# Patient Record
Sex: FEMALE | Race: WHITE
[De-identification: names, ages, dates, MRNs, and addresses within clinical notes are randomized per-mention and may not be internally consistent; named-entity substitution may affect disease eponyms.]

---

## 2017-07-14 ENCOUNTER — HOSPITAL ENCOUNTER (EMERGENCY)
Dept: HOSPITAL 25 - UCEAST | Age: 57
Discharge: HOME | End: 2017-07-14
Payer: COMMERCIAL

## 2017-07-14 VITALS — DIASTOLIC BLOOD PRESSURE: 73 MMHG | SYSTOLIC BLOOD PRESSURE: 130 MMHG

## 2017-07-14 DIAGNOSIS — N39.0: Primary | ICD-10-CM

## 2017-07-14 DIAGNOSIS — Z87.891: ICD-10-CM

## 2017-07-14 PROCEDURE — G0463 HOSPITAL OUTPT CLINIC VISIT: HCPCS

## 2017-07-14 PROCEDURE — 81003 URINALYSIS AUTO W/O SCOPE: CPT

## 2017-07-14 PROCEDURE — 99212 OFFICE O/P EST SF 10 MIN: CPT

## 2017-07-14 PROCEDURE — 87086 URINE CULTURE/COLONY COUNT: CPT

## 2017-07-14 NOTE — UC
Complaint Female HPI





- HPI Summary


HPI Summary: 





complaint of burning with urination that started 3 days ago


 increase in frequency and urgency


mild lower back pain


denies flank and abdominal pain


 denies fever and chills


not taking any medication for pain





- History Of Current Complaint


Hx Obtained From: Patient





<Sulema Soni - Last Filed: 17 11:14>





<Rosa Cruz - Last Filed: 17 15:35>





- History Of Current Complaint


Chief Complaint: UCAbdominalPain


Stated Complaint: URINATION COMPLAINT


Time Seen by Provider: 17 11:06





- Allergies/Home Medications


Allergies/Adverse Reactions: 


 Allergies











Allergy/AdvReac Type Severity Reaction Status Date / Time


 


Fexofenadine [From Allegra] Allergy Intermediate See Comment Verified 17 

10:06


 


Adhesive Tape Allergy  terrible Verified 17 10:06





   rash  


 


Codeine AdvReac Severe Dizziness Verified 17 10:06














PMH/Surg Hx/FS Hx/Imm Hx


Previously Healthy: Yes - repaired aneurysm





- Surgical History


Surgical History: Yes


Surgery Procedure, Year, and Place:  .   tubal ligation.   

(right) foot neuroma excision.  2010 (left) carpal tunnel.  2011 (right) 

thyroid nodule.  2 BRAIN ANEURSYMS COILED IN - [place].  RECONSTRUCTIVE 

SURGERY -LEFT FOOT--[facility].  Gallbladder removed 





- Family History


Known Family History: Positive: None - reviewed & noncontributory





- Social History


Occupation: Employed Full-time


Lives: With Family


Alcohol Use: None


Substance Use Type: None


Smoking Status (MU): Former Smoker


Amount Used/How Often: 1 PPD X 30 YEARS


Have You Smoked in the Last Year: No


When Did the Patient Quit Smoking/Using Tobacco: 





<Sulema Soni - Last Filed: 17 11:14>





Review of Systems


Constitutional: Negative


Skin: Negative


Eyes: Negative


ENT: Negative


Respiratory: Negative


Cardiovascular: Negative


Gastrointestinal: Negative


Genitourinary: Dysuria, Frequency, Urgency


Motor: Negative


Neurovascular: Negative


Musculoskeletal: Negative


Neurological: Negative


Psychological: Negative


All Other Systems Reviewed And Are Negative: Yes





<Sulema Soni - Last Filed: 17 11:14>





Physical Exam


Triage Information Reviewed: Yes


Appearance: No Pain Distress, Well-Nourished


Vital Signs: 


 Initial Vital Signs











Temp  97.4 F   17 10:00


 


Pulse  73   17 10:00


 


Resp  16   17 10:00


 


BP  130/73   17 10:00


 


Pulse Ox  97   17 10:00











Vital Signs Reviewed: Yes


Eyes: Positive: Conjunctiva Clear


ENT: Positive: Pharynx normal, TMs normal


Neck: Positive: No Lymphadenopathy


Respiratory: Positive: Lungs clear, Normal breath sounds, No respiratory 

distress, No accessory muscle use


Cardiovascular: Positive: RRR, No Murmur, Pulses Normal


Abdomen Description: Positive: Nontender, No Organomegaly, Soft.  Negative: CVA 

Tenderness (R), CVA Tenderness (L), Distended, Guarding


Bowel Sounds: Positive: Present


Musculoskeletal: Positive: No Edema


Neurological: Positive: Alert


Psychological Exam: Normal


Skin Exam: Normal





<Sulema Soni - Last Filed: 17 11:14>


Vital Signs: 


 Initial Vital Signs











Temp  97.4 F   17 10:00


 


Pulse  73   17 10:00


 


Resp  16   17 10:00


 


BP  130/73   17 10:00


 


Pulse Ox  97   17 10:00














<Rosa Cruz - Last Filed: 17 15:35>





 Complaint Female Dx





- Differential Dx/Diagnosis


Differential Diagnosis/HQI/PQRI: Ureteral Stone, Urinary Tract Infection


Provider Diagnoses: UTI





<Sulema Soni - Last Filed: 17 11:14>





Discharge





<Sulema Soni - Last Filed: 17 11:14>





<Rosa Cruz - Last Filed: 17 15:35>





- Discharge Plan


Condition: Stable


Disposition: HOME


Prescriptions: 


Phenazopyridine TAB* [Pyridium 100 mg TAB*] 100 mg PO TID #6 tab


Sulfamethox/Trimethoprim DS* [Bactrim /160 TAB*] 1 tab PO BID #6 tab


Patient Education Materials:  Urinary Tract Infection in Women (ED)


Referrals: 


Chinmay Lovell MD [Primary Care Provider] - 


Additional Instructions: 


Please start antibiotic as directed


Increase fluids and rest


Take acetaminophen  for fever or pain


Please review your discharge instructions. 


If your symptoms do not improve please call your primary care provider or 

return to urgent care.





.








Attestation Statement


User Type: Provider - I was available for consult. This patient was seen by the 

MELANIA. The patient was not presented to, seen by, or examined by me. -Yeny





<Rosa Cruz - Last Filed: 17 15:35>

## 2019-09-01 ENCOUNTER — HOSPITAL ENCOUNTER (EMERGENCY)
Dept: HOSPITAL 25 - ED | Age: 59
Discharge: HOME | End: 2019-09-01
Payer: COMMERCIAL

## 2019-09-01 VITALS — SYSTOLIC BLOOD PRESSURE: 145 MMHG | DIASTOLIC BLOOD PRESSURE: 72 MMHG

## 2019-09-01 DIAGNOSIS — R10.32: Primary | ICD-10-CM

## 2019-09-01 DIAGNOSIS — Z87.891: ICD-10-CM

## 2019-09-01 LAB
ALBUMIN SERPL BCG-MCNC: 4.2 G/DL (ref 3.2–5.2)
ALBUMIN/GLOB SERPL: 1.6 {RATIO} (ref 1–3)
ALP SERPL-CCNC: 112 U/L (ref 34–104)
ALT SERPL W P-5'-P-CCNC: 21 U/L (ref 7–52)
ANION GAP SERPL CALC-SCNC: 5 MMOL/L (ref 2–11)
AST SERPL-CCNC: 18 U/L (ref 13–39)
BASOPHILS # BLD AUTO: 0.1 10^3/UL (ref 0–0.2)
BUN SERPL-MCNC: 11 MG/DL (ref 6–24)
BUN/CREAT SERPL: 13.4 (ref 8–20)
CALCIUM SERPL-MCNC: 9.6 MG/DL (ref 8.6–10.3)
CHLORIDE SERPL-SCNC: 108 MMOL/L (ref 101–111)
EOSINOPHIL # BLD AUTO: 0.2 10^3/UL (ref 0–0.6)
GLOBULIN SER CALC-MCNC: 2.7 G/DL (ref 2–4)
GLUCOSE SERPL-MCNC: 87 MG/DL (ref 70–100)
HCO3 SERPL-SCNC: 26 MMOL/L (ref 22–32)
HCT VFR BLD AUTO: 39 % (ref 35–47)
HGB BLD-MCNC: 13 G/DL (ref 12–16)
INR PPP/BLD: 1.03 (ref 0.82–1.09)
LYMPHOCYTES # BLD AUTO: 2.2 10^3/UL (ref 1–4.8)
MAGNESIUM SERPL-MCNC: 1.9 MG/DL (ref 1.9–2.7)
MCH RBC QN AUTO: 29 PG (ref 27–31)
MCHC RBC AUTO-ENTMCNC: 33 G/DL (ref 31–36)
MCV RBC AUTO: 86 FL (ref 80–97)
MONOCYTES # BLD AUTO: 1.1 10^3/UL (ref 0–0.8)
NEUTROPHILS # BLD AUTO: 7 10^3/UL (ref 1.5–7.7)
NRBC # BLD AUTO: 0 10^3/UL
NRBC BLD QL AUTO: 0.1
PLATELET # BLD AUTO: 163 10^3/UL (ref 150–450)
POTASSIUM SERPL-SCNC: 3.9 MMOL/L (ref 3.5–5)
PROT SERPL-MCNC: 6.9 G/DL (ref 6.4–8.9)
RBC # BLD AUTO: 4.53 10^6 /UL (ref 3.7–4.87)
SODIUM SERPL-SCNC: 139 MMOL/L (ref 135–145)
WBC # BLD AUTO: 10.7 10^3/UL (ref 3.5–10.8)

## 2019-09-01 PROCEDURE — 80053 COMPREHEN METABOLIC PANEL: CPT

## 2019-09-01 PROCEDURE — 36415 COLL VENOUS BLD VENIPUNCTURE: CPT

## 2019-09-01 PROCEDURE — 83690 ASSAY OF LIPASE: CPT

## 2019-09-01 PROCEDURE — 83735 ASSAY OF MAGNESIUM: CPT

## 2019-09-01 PROCEDURE — 85610 PROTHROMBIN TIME: CPT

## 2019-09-01 PROCEDURE — 99283 EMERGENCY DEPT VISIT LOW MDM: CPT

## 2019-09-01 PROCEDURE — 86140 C-REACTIVE PROTEIN: CPT

## 2019-09-01 PROCEDURE — 85025 COMPLETE CBC W/AUTO DIFF WBC: CPT

## 2019-09-01 PROCEDURE — 74176 CT ABD & PELVIS W/O CONTRAST: CPT

## 2019-09-01 PROCEDURE — 83605 ASSAY OF LACTIC ACID: CPT
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
